# Patient Record
Sex: MALE | Race: OTHER | NOT HISPANIC OR LATINO
[De-identification: names, ages, dates, MRNs, and addresses within clinical notes are randomized per-mention and may not be internally consistent; named-entity substitution may affect disease eponyms.]

---

## 2020-03-16 ENCOUNTER — APPOINTMENT (OUTPATIENT)
Dept: SURGERY | Facility: CLINIC | Age: 55
End: 2020-03-16
Payer: COMMERCIAL

## 2020-03-16 VITALS — HEIGHT: 67 IN | BODY MASS INDEX: 45.2 KG/M2 | WEIGHT: 288 LBS

## 2020-03-16 DIAGNOSIS — Z12.11 ENCOUNTER FOR SCREENING FOR MALIGNANT NEOPLASM OF COLON: ICD-10-CM

## 2020-03-16 PROBLEM — Z00.00 ENCOUNTER FOR PREVENTIVE HEALTH EXAMINATION: Status: ACTIVE | Noted: 2020-03-16

## 2020-03-16 PROCEDURE — 99203 OFFICE O/P NEW LOW 30 MIN: CPT

## 2020-03-17 RX ORDER — SODIUM PICOSULFATE, MAGNESIUM OXIDE, AND ANHYDROUS CITRIC ACID 10; 3.5; 12 MG/160ML; G/160ML; G/160ML
10-3.5-12 MG-GM LIQUID ORAL
Qty: 1 | Refills: 0 | Status: ACTIVE | COMMUNITY
Start: 2020-03-17 | End: 1900-01-01

## 2020-03-17 NOTE — PHYSICAL EXAM
[Normal Breath Sounds] : Normal breath sounds [Normal Heart Sounds] : normal heart sounds [Abdominal Masses] : No abdominal masses [Abdomen Tenderness] : ~T ~M No abdominal tenderness [No Rash or Lesion] : No rash or lesion [de-identified] : nl [de-identified] : nl [de-identified] : nl

## 2020-03-17 NOTE — ASSESSMENT
[FreeTextEntry1] : Long discussion regarding all options and risks\par Bowel prep written and explained\par Scheduled for colonoscopy on 4/21/20

## 2020-03-17 NOTE — HISTORY OF PRESENT ILLNESS
[de-identified] : This 54 year old man has never undergone a colonoscopy. There is no FH of colon CA. The patient is asymptomatic regarding his GI tract.

## 2020-05-30 ENCOUNTER — OUTPATIENT (OUTPATIENT)
Dept: OUTPATIENT SERVICES | Facility: HOSPITAL | Age: 55
LOS: 1 days | End: 2020-05-30
Payer: COMMERCIAL

## 2020-05-30 ENCOUNTER — APPOINTMENT (OUTPATIENT)
Dept: DISASTER EMERGENCY | Facility: CLINIC | Age: 55
End: 2020-05-30

## 2020-05-30 DIAGNOSIS — Z01.818 ENCOUNTER FOR OTHER PREPROCEDURAL EXAMINATION: ICD-10-CM

## 2020-05-30 DIAGNOSIS — Z11.59 ENCOUNTER FOR SCREENING FOR OTHER VIRAL DISEASES: ICD-10-CM

## 2020-05-30 PROCEDURE — 87635 SARS-COV-2 COVID-19 AMP PRB: CPT

## 2020-05-31 LAB — SARS-COV-2 N GENE NPH QL NAA+PROBE: NOT DETECTED

## 2020-06-01 ENCOUNTER — TRANSCRIPTION ENCOUNTER (OUTPATIENT)
Age: 55
End: 2020-06-01

## 2020-06-02 ENCOUNTER — RESULT REVIEW (OUTPATIENT)
Age: 55
End: 2020-06-02

## 2020-06-02 ENCOUNTER — OUTPATIENT (OUTPATIENT)
Dept: OUTPATIENT SERVICES | Facility: HOSPITAL | Age: 55
LOS: 1 days | End: 2020-06-02
Payer: COMMERCIAL

## 2020-06-02 DIAGNOSIS — Z12.11 ENCOUNTER FOR SCREENING FOR MALIGNANT NEOPLASM OF COLON: ICD-10-CM

## 2020-06-02 PROCEDURE — 88305 TISSUE EXAM BY PATHOLOGIST: CPT | Mod: 26

## 2020-06-02 PROCEDURE — 88305 TISSUE EXAM BY PATHOLOGIST: CPT

## 2020-06-02 PROCEDURE — 45385 COLONOSCOPY W/LESION REMOVAL: CPT | Mod: PT

## 2020-06-02 PROCEDURE — 45385 COLONOSCOPY W/LESION REMOVAL: CPT

## 2020-06-02 RX ORDER — SODIUM CHLORIDE 9 MG/ML
1000 INJECTION INTRAMUSCULAR; INTRAVENOUS; SUBCUTANEOUS
Refills: 0 | Status: DISCONTINUED | OUTPATIENT
Start: 2020-06-02 | End: 2020-06-17

## 2020-06-04 LAB — SURGICAL PATHOLOGY STUDY: SIGNIFICANT CHANGE UP

## 2020-12-23 PROBLEM — Z12.11 ENCOUNTER FOR SCREENING COLONOSCOPY: Status: RESOLVED | Noted: 2020-03-17 | Resolved: 2020-12-23

## 2023-09-22 ENCOUNTER — OFFICE (OUTPATIENT)
Dept: URBAN - METROPOLITAN AREA CLINIC 6 | Facility: CLINIC | Age: 58
Setting detail: OPHTHALMOLOGY
End: 2023-09-22
Payer: MEDICAID

## 2023-09-22 DIAGNOSIS — H52.7: ICD-10-CM

## 2023-09-22 DIAGNOSIS — E11.9: ICD-10-CM

## 2023-09-22 PROCEDURE — 92015 DETERMINE REFRACTIVE STATE: CPT | Performed by: OPHTHALMOLOGY

## 2023-09-22 PROCEDURE — 92004 COMPRE OPH EXAM NEW PT 1/>: CPT | Performed by: OPHTHALMOLOGY

## 2023-09-22 ASSESSMENT — VISUAL ACUITY
OS_BCVA: 20/20
OD_BCVA: 20/20

## 2023-09-22 ASSESSMENT — REFRACTION_MANIFEST
OS_CYLINDER: -0.75
OS_VA1: 20/20
OD_CYLINDER: 0.00
OD_SPHERE: +1.50
OD_VA1: 20/20
OS_VA1: 20/20
OS_SPHERE: +2.00
OD_VA1: 20/20
OD_AXIS: 050
OD_CYLINDER: 0.00
OD_ADD: +2.25
OS_AXIS: 125
OS_SPHERE: +2.00
OS_ADD: +2.25
OS_CYLINDER: -0.75
OD_AXIS: 050
OS_AXIS: 125
OD_SPHERE: +1.50

## 2023-09-22 ASSESSMENT — REFRACTION_AUTOREFRACTION
OS_CYLINDER: -0.75
OD_SPHERE: +1.50
OS_SPHERE: +2.00
OS_AXIS: 123
OD_CYLINDER: -0.25
OD_AXIS: 048

## 2023-09-22 ASSESSMENT — REFRACTION_CURRENTRX
OD_CYLINDER: -0.50
OS_VPRISM_DIRECTION: PROGS
OD_VPRISM_DIRECTION: PROGS
OS_SPHERE: +1.50
OS_CYLINDER: -0.50
OS_OVR_VA: 20/
OD_AXIS: 035
OS_AXIS: 130
OD_OVR_VA: 20/
OD_SPHERE: +1.25
OS_ADD: +2.25
OD_ADD: +2.25

## 2023-09-22 ASSESSMENT — TONOMETRY
OD_IOP_MMHG: 21
OS_IOP_MMHG: 20

## 2023-09-22 ASSESSMENT — KERATOMETRY
OD_K2POWER_DIOPTERS: 42.25
OS_K1POWER_DIOPTERS: 41.50
OS_K2POWER_DIOPTERS: 42.25
OD_K1POWER_DIOPTERS: 41.75
OD_AXISANGLE_DEGREES: 102
OS_AXISANGLE_DEGREES: 040

## 2023-09-22 ASSESSMENT — AXIALLENGTH_DERIVED
OS_AL: 23.5557
OD_AL: 23.5585
OD_AL: 23.6072
OD_AL: 23.5585
OS_AL: 23.5557
OS_AL: 23.5557

## 2023-09-22 ASSESSMENT — SPHEQUIV_DERIVED
OD_SPHEQUIV: 1.5
OS_SPHEQUIV: 1.625
OD_SPHEQUIV: 1.375
OS_SPHEQUIV: 1.625
OD_SPHEQUIV: 1.5
OS_SPHEQUIV: 1.625

## 2023-09-22 ASSESSMENT — CONFRONTATIONAL VISUAL FIELD TEST (CVF)
OD_FINDINGS: FULL
OS_FINDINGS: FULL